# Patient Record
Sex: MALE | Race: WHITE
[De-identification: names, ages, dates, MRNs, and addresses within clinical notes are randomized per-mention and may not be internally consistent; named-entity substitution may affect disease eponyms.]

---

## 2021-07-14 ENCOUNTER — HOSPITAL ENCOUNTER (EMERGENCY)
Dept: HOSPITAL 75 - ER | Age: 36
Discharge: HOME | End: 2021-07-14
Payer: SELF-PAY

## 2021-07-14 VITALS — SYSTOLIC BLOOD PRESSURE: 138 MMHG | DIASTOLIC BLOOD PRESSURE: 100 MMHG

## 2021-07-14 VITALS — HEIGHT: 73.62 IN | BODY MASS INDEX: 22.08 KG/M2 | WEIGHT: 170.2 LBS
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
## 2021-07-14 DIAGNOSIS — F17.200: ICD-10-CM

## 2021-07-14 DIAGNOSIS — F10.10: Primary | ICD-10-CM

## 2021-07-14 LAB
ALBUMIN SERPL-MCNC: 4.5 GM/DL (ref 3.2–4.5)
ALP SERPL-CCNC: 43 U/L (ref 40–136)
ALT SERPL-CCNC: 57 U/L (ref 0–55)
APTT PPP: (no result) S
BACTERIA #/AREA URNS HPF: NEGATIVE /HPF
BARBITURATES UR QL: NEGATIVE
BASOPHILS # BLD AUTO: 0 10^3/UL (ref 0–0.1)
BASOPHILS NFR BLD AUTO: 1 % (ref 0–10)
BENZODIAZ UR QL SCN: NEGATIVE
BILIRUB SERPL-MCNC: 1.5 MG/DL (ref 0.1–1)
BILIRUB UR QL STRIP: NEGATIVE
BUN/CREAT SERPL: 5
CALCIUM SERPL-MCNC: 9.1 MG/DL (ref 8.5–10.1)
CHLORIDE SERPL-SCNC: 98 MMOL/L (ref 98–107)
CO2 SERPL-SCNC: 27 MMOL/L (ref 21–32)
COCAINE UR QL: NEGATIVE
CREAT SERPL-MCNC: 0.84 MG/DL (ref 0.6–1.3)
EOSINOPHIL # BLD AUTO: 0 10^3/UL (ref 0–0.3)
EOSINOPHIL NFR BLD AUTO: 1 % (ref 0–10)
FIBRINOGEN PPP-MCNC: CLEAR MG/DL
GFR SERPLBLD BASED ON 1.73 SQ M-ARVRAT: > 60 ML/MIN
GLUCOSE SERPL-MCNC: 112 MG/DL (ref 70–105)
GLUCOSE UR STRIP-MCNC: NEGATIVE MG/DL
HCT VFR BLD CALC: 47 % (ref 40–54)
HGB BLD-MCNC: 16.5 G/DL (ref 13.3–17.7)
KETONES UR QL STRIP: (no result)
LEUKOCYTE ESTERASE UR QL STRIP: NEGATIVE
LYMPHOCYTES # BLD AUTO: 0.5 10^3/UL (ref 1–4)
LYMPHOCYTES NFR BLD AUTO: 10 % (ref 12–44)
MAGNESIUM SERPL-MCNC: 1.4 MG/DL (ref 1.6–2.4)
MANUAL DIFFERENTIAL PERFORMED BLD QL: NO
MCH RBC QN AUTO: 36 PG (ref 25–34)
MCHC RBC AUTO-ENTMCNC: 35 G/DL (ref 32–36)
MCV RBC AUTO: 101 FL (ref 80–99)
METHADONE UR QL SCN: NEGATIVE
METHAMPHETAMINE SCREEN URINE S: NEGATIVE
MONOCYTES # BLD AUTO: 0.6 10^3/UL (ref 0–1)
MONOCYTES NFR BLD AUTO: 12 % (ref 0–12)
NEUTROPHILS # BLD AUTO: 3.7 10^3/UL (ref 1.8–7.8)
NEUTROPHILS NFR BLD AUTO: 77 % (ref 42–75)
NITRITE UR QL STRIP: NEGATIVE
OPIATES UR QL SCN: NEGATIVE
OXYCODONE UR QL: NEGATIVE
PH UR STRIP: 8.5 [PH] (ref 5–9)
PLATELET # BLD: 195 10^3/UL (ref 130–400)
PMV BLD AUTO: 8.4 FL (ref 9–12.2)
POTASSIUM SERPL-SCNC: 3.6 MMOL/L (ref 3.6–5)
PROPOXYPH UR QL: NEGATIVE
PROT SERPL-MCNC: 7.7 GM/DL (ref 6.4–8.2)
PROT UR QL STRIP: (no result)
RBC #/AREA URNS HPF: (no result) /HPF
SODIUM SERPL-SCNC: 141 MMOL/L (ref 135–145)
SP GR UR STRIP: 1.01 (ref 1.02–1.02)
TRICYCLICS UR QL SCN: NEGATIVE
WBC # BLD AUTO: 4.8 10^3/UL (ref 4.3–11)
WBC #/AREA URNS HPF: (no result) /HPF

## 2021-07-14 PROCEDURE — 99283 EMERGENCY DEPT VISIT LOW MDM: CPT

## 2021-07-14 PROCEDURE — 80320 DRUG SCREEN QUANTALCOHOLS: CPT

## 2021-07-14 PROCEDURE — 36415 COLL VENOUS BLD VENIPUNCTURE: CPT

## 2021-07-14 PROCEDURE — 80306 DRUG TEST PRSMV INSTRMNT: CPT

## 2021-07-14 PROCEDURE — 81000 URINALYSIS NONAUTO W/SCOPE: CPT

## 2021-07-14 PROCEDURE — 80053 COMPREHEN METABOLIC PANEL: CPT

## 2021-07-14 PROCEDURE — 83735 ASSAY OF MAGNESIUM: CPT

## 2021-07-14 PROCEDURE — 85025 COMPLETE CBC W/AUTO DIFF WBC: CPT

## 2021-07-14 NOTE — ED GENERAL
General


Chief Complaint:  Detox


Stated Complaint:  ETOH DETOX


Nursing Triage Note:  


AMB TO ROOM  WANTS DETOX. LAST TIME HE DRANK WAS LAST NIGHT VORED USUALLY DRINKS




1/2 GALLON DAILY. STATES IS READY FOR HELP MONDAY NIGHT PASSED OUT AND  URINATED




ON HIMSELF.


Source of Information:  Patient


Exam Limitations:  No Limitations





History of Present Illness


Date Seen by Provider:  Jul 14, 2021


Time Seen by Provider:  10:40


Initial Comments


Patient to the ER by private conveyance with chief complaint that he would like 

to detox.  He usually drinks about half a gallon of liquor a day plus beer after

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  He has not 

done inpatient rehab before but he would like to do it because he did outpatient

rehab through [facility] this time last year and felt like he relapsed 

immediately.  For at least the last 6 months he has been drinking this much.  He

has been drinking since he was 15.  He says he has tremors when he stops 

drinking but has never had seizures.  His sister has been calling around looking

for an inpatient rehab place and found 1 somewhere in [place] and told him 

to come to the ER to be medically evaluated and admitted so he can be medically 

detoxed.  The patient states he is ready to quit drinking.  His last drink was 

yesterday evening.





Allergies and Home Medications


Allergies


Uncoded Allergies:  


     TEA (Allergy, Unknown, 7/14/21)





Patient Home Medication List


Home Medication List Reviewed:  Yes





Review of Systems


Review of Systems


Constitutional:  No chills, No fever


EENTM:  No hearing loss, No ear pain


Respiratory:  No cough, No short of breath


Cardiovascular:  No chest pain, No palpitations


Gastrointestinal:  No abdominal pain, No nausea, No vomiting


Genitourinary:  No discharge, No dysuria


Musculoskeletal:  No back pain, No joint pain


Skin:  No pruritus, No rash


Psychiatric/Neurological:  See HPI, Anxiety





All Other Systems Reviewed


Negative Unless Noted:  Yes





Past Medical-Social-Family Hx


Patient Social History


Tobacco Use?:  Yes


Smoking Status:  Current Someday Smoker


Use of E-Cig and/or Vaping dev:  No


Substance use?:  No


Alcohol Use?:  Yes


Alcohol type:  Other


Alcohol Frequency:  Daily


Pt feels they are or have been:  No





Immunizations Up To Date


Influenza Vaccine Up-to-Date:  No; Not Current





Physical Exam


Vital Signs





Vital Signs - First Documented








 7/14/21





 09:50


 


Temp 37.2


 


Pulse 106


 


Resp 18


 


B/P (MAP) 128/96 (107)


 


Pulse Ox 97





Capillary Refill : Less Than 3 Seconds


Height, Weight, BMI


Height: '"


Weight: lbs. oz. kg;  BMI


Method:


General Appearance:  No Apparent Distress, WD/WN


Eyes:  Bilateral Eye Normal Inspection, Bilateral Eye PERRL, Bilateral Eye EOMI


HEENT:  PERRL/EOMI, TMs Normal, Pharynx Normal, Moist Mucous Membranes


Neck:  Full Range of Motion, Normal Inspection, Non Tender


Respiratory:  Lungs Clear, Normal Breath Sounds, No Accessory Muscle Use, No 

Respiratory Distress


Cardiovascular:  Regular Rate, Rhythm, No Edema, Normal Peripheral Pulses


Extremity:  Normal Capillary Refill, Normal Inspection, No Pedal Edema


Neurologic/Psychiatric:  Alert, Oriented x3, Other (mild anxious affect with 

mild tremor.)





Progress/Results/Core Measures


Suspected Sepsis


SIRS


Temperature: 


Pulse: 106 


Respiratory Rate: 18


 


Laboratory Tests


7/14/21 10:40: White Blood Count 4.8


Blood Pressure 128 /96 


Mean: 107


 


Laboratory Tests


7/14/21 10:40: 


Creatinine 0.84, Platelet Count 195, Total Bilirubin 1.5H








Results/Orders


Lab Results





Laboratory Tests








Test


 7/14/21


10:40 Range/Units


 


 


White Blood Count


 4.8 


 4.3-11.0


10^3/uL


 


Red Blood Count


 4.61 


 4.30-5.52


10^6/uL


 


Hemoglobin 16.5  13.3-17.7  g/dL


 


Hematocrit 47  40-54  %


 


Mean Corpuscular Volume 101 H 80-99  fL


 


Mean Corpuscular Hemoglobin 36 H 25-34  pg


 


Mean Corpuscular Hemoglobin


Concent 35 


 32-36  g/dL





 


Red Cell Distribution Width 11.8  10.0-14.5  %


 


Platelet Count


 195 


 130-400


10^3/uL


 


Mean Platelet Volume 8.4 L 9.0-12.2  fL


 


Immature Granulocyte % (Auto) 0   %


 


Neutrophils (%) (Auto) 77 H 42-75  %


 


Lymphocytes (%) (Auto) 10 L 12-44  %


 


Monocytes (%) (Auto) 12  0-12  %


 


Eosinophils (%) (Auto) 1  0-10  %


 


Basophils (%) (Auto) 1  0-10  %


 


Neutrophils # (Auto)


 3.7 


 1.8-7.8


10^3/uL


 


Lymphocytes # (Auto)


 0.5 L


 1.0-4.0


10^3/uL


 


Monocytes # (Auto)


 0.6 


 0.0-1.0


10^3/uL


 


Eosinophils # (Auto)


 0.0 


 0.0-0.3


10^3/uL


 


Basophils # (Auto)


 0.0 


 0.0-0.1


10^3/uL


 


Immature Granulocyte # (Auto)


 0.0 


 0.0-0.1


10^3/uL


 


Urine Color ORANGE   


 


Urine Clarity CLEAR   


 


Urine pH 8.5  5-9  


 


Urine Specific Gravity 1.010 L 1.016-1.022  


 


Urine Protein 1+ H NEGATIVE  


 


Urine Glucose (UA) NEGATIVE  NEGATIVE  


 


Urine Ketones 1+ H NEGATIVE  


 


Urine Nitrite NEGATIVE  NEGATIVE  


 


Urine Bilirubin NEGATIVE  NEGATIVE  


 


Urine Urobilinogen 4.0  < = 1.0  MG/DL


 


Urine Leukocyte Esterase NEGATIVE  NEGATIVE  


 


Urine RBC (Auto) NEGATIVE  NEGATIVE  


 


Urine RBC NONE   /HPF


 


Urine WBC RARE   /HPF


 


Urine Crystals NONE   /LPF


 


Urine Bacteria NEGATIVE   /HPF


 


Urine Casts NONE   /LPF


 


Urine Mucus NEGATIVE   /LPF


 


Urine Culture Indicated NO   


 


Sodium Level 141  135-145  MMOL/L


 


Potassium Level 3.6  3.6-5.0  MMOL/L


 


Chloride Level 98    MMOL/L


 


Carbon Dioxide Level 27  21-32  MMOL/L


 


Anion Gap 16 H 5-14  MMOL/L


 


Blood Urea Nitrogen 4 L 7-18  MG/DL


 


Creatinine


 0.84 


 0.60-1.30


MG/DL


 


Estimat Glomerular Filtration


Rate > 60 


  





 


BUN/Creatinine Ratio 5   


 


Glucose Level 112 H   MG/DL


 


Calcium Level 9.1  8.5-10.1  MG/DL


 


Corrected Calcium 8.7  8.5-10.1  MG/DL


 


Magnesium Level 1.4 L 1.6-2.4  MG/DL


 


Total Bilirubin 1.5 H 0.1-1.0  MG/DL


 


Aspartate Amino Transf


(AST/SGOT) 94 H


 5-34  U/L





 


Alanine Aminotransferase


(ALT/SGPT) 57 H


 0-55  U/L





 


Alkaline Phosphatase 43    U/L


 


Total Protein 7.7  6.4-8.2  GM/DL


 


Albumin 4.5  3.2-4.5  GM/DL


 


Urine Opiates Screen NEGATIVE  NEGATIVE  


 


Urine Oxycodone Screen NEGATIVE  NEGATIVE  


 


Urine Methadone Screen NEGATIVE  NEGATIVE  


 


Urine Propoxyphene Screen NEGATIVE  NEGATIVE  


 


Urine Barbiturates Screen NEGATIVE  NEGATIVE  


 


Ur Tricyclic Antidepressants


Screen NEGATIVE 


 NEGATIVE  





 


Urine Phencyclidine Screen NEGATIVE  NEGATIVE  


 


Urine Amphetamines Screen NEGATIVE  NEGATIVE  


 


Urine Methamphetamines Screen NEGATIVE  NEGATIVE  


 


Urine Benzodiazepines Screen NEGATIVE  NEGATIVE  


 


Urine Cocaine Screen NEGATIVE  NEGATIVE  


 


Urine Cannabinoids Screen POSITIVE H NEGATIVE  


 


Serum Alcohol < 10  <10  MG/DL








My Orders





Orders - ANDREINA TOLBERT KARELY


Cbc With Automated Diff (7/14/21 10:11)


Comprehensive Metabolic Panel (7/14/21 10:11)


Magnesium (7/14/21 10:11)


Alcohol (7/14/21 10:11)


Ua Culture If Indicated (7/14/21 10:11)


Drug Screen Stat (Urine) (7/14/21 10:11)


Magnesium Oxide Tablet (Mag Ox Tablet) (7/14/21 11:30)


Thiamine Tablet (Vitamin B-1 Tablet) (7/14/21 11:30)


Folic Acid Tablet (Folic Acid Tablet) (7/14/21 11:30)


Lorazepam Tablet (Ativan Tablet) (7/14/21 14:08)


General/Regular (7/14/21 Lunch)


Rx-Lorazepam (Rx-Ativan) (7/14/21 14:52)





Medications Given in ED





Current Medications








 Medications  Dose


 Ordered  Sig/Senthil


 Route  Start Time


 Stop Time Status Last Admin


Dose Admin


 


 Folic Acid  1 mg  ONCE  ONCE


 PO  7/14/21 11:30


 7/14/21 11:31 DC 7/14/21 11:34


1 MG


 


 Magnesium Oxide  400 mg  ONCE  ONCE


 PO  7/14/21 11:30


 7/14/21 11:31 DC 7/14/21 11:34


400 MG


 


 Thiamine HCl  100 mg  ONCE  ONCE


 PO  7/14/21 11:30


 7/14/21 11:31 DC 7/14/21 11:34


100 MG








Vital Signs/I&O











 7/14/21





 09:50


 


Temp 37.2


 


Pulse 106


 


Resp 18


 


B/P (MAP) 128/96 (107)


 


Pulse Ox 97





Capillary Refill : Less Than 3 Seconds








Blood Pressure Mean:                    107








Progress Note #1:  


   Time:  14:12


Progress Note


Patient's been stable with some anxious affect since he arrived.  His labs are 

okay and he does not require any further IV intervention or inpatient 

intervention.  He wants to go to an inpatient treatment facility.  We talked 

about drawer but his family wants him to go somewhere in [place].  We asked 

the patient to give us the name or phone number of the facility so we can call 

ahead and speak to them about their admission requirements and they said that 

they are taking care of it through [facility].  We called Dr. Crouch at 

[facility] and she said she would have her care coordinator for alcohol 

withdrawal get a hold of us to help coordinate this.


Discussed this with the patient in regard to give him a dose of Ativan and 

something to eat and see how he feels after that.  If he tolerates this then he 

may be a good candidate for tapering dose of Librium outpatient.  Discussed the 

case with Dr. Mayfield on call provider for inpatient [facility] and he feels at this 

time the patient does not meet any inpatient criteria and would recommend 

outpatient benzo such as Librium.


Progress Note #2:  


   Time:  14:35


Progress Note


Patient has a visitor Nat who brought him some lunch.  He is eating and took 

the Ativan and feeling a little better.  Shelia from [facility] alcohol 

addiction coordinator called us and says she will reach out to the team and find

out what the plan was and see if she can get him some direct help from her in 

terms of being directed to a inpatient alcohol treatment center.  We discussed 

this plan with the patient and his friend and they are in agreement and he is 

going to finish his lunch and wait to hear back from any.


Progress Note #3:  


   Time:  14:50


Progress Note


Shelia from [facility] called us back and says she was able to arrange for 

him to go straight to [facility] today.  She would just like us to send him with

enough Ativan to get him through the night and Micky will take over a taper in 

the morning.  Patient is in agreement with going to [facility].





Departure


Impression





   Primary Impression:  


   Alcohol abuse


Disposition:  01 HOME, SELF-CARE


Condition:  Stable





Departure-Patient Inst.


Decision time for Depature:  14:51


Referrals:  


NO,LOCAL PHYSICIAN (PCP/Family)


Primary Care Physician


Patient Instructions:  Alcohol Abuse and Alcoholism (DC)





Add. Discharge Instructions:  


Ativan 1/2 mg every 4 hours as necessary for agitation, tremors, withdrawal 

symptoms.


Go from the ER directly over to [facility] and they are waiting for you.


[facility] 


[address] [place] [address]


[phone]


All discharge instructions reviewed with patient and/or family. Voiced 

understanding.











ANDREINA TOLBERT                 Jul 14, 2021 11:30